# Patient Record
Sex: MALE | Race: WHITE | NOT HISPANIC OR LATINO | URBAN - METROPOLITAN AREA
[De-identification: names, ages, dates, MRNs, and addresses within clinical notes are randomized per-mention and may not be internally consistent; named-entity substitution may affect disease eponyms.]

---

## 2019-05-02 ENCOUNTER — EMERGENCY (EMERGENCY)
Facility: HOSPITAL | Age: 79
LOS: 1 days | Discharge: HOME | End: 2019-05-02
Attending: EMERGENCY MEDICINE
Payer: MEDICARE

## 2019-05-02 VITALS
HEART RATE: 72 BPM | OXYGEN SATURATION: 98 % | DIASTOLIC BLOOD PRESSURE: 83 MMHG | TEMPERATURE: 98 F | RESPIRATION RATE: 16 BRPM | HEIGHT: 71 IN | WEIGHT: 179.9 LBS | SYSTOLIC BLOOD PRESSURE: 211 MMHG

## 2019-05-02 PROCEDURE — 99284 EMERGENCY DEPT VISIT MOD MDM: CPT | Mod: 25

## 2019-05-02 PROCEDURE — 12011 RPR F/E/E/N/L/M 2.5 CM/<: CPT

## 2019-05-03 PROCEDURE — 70450 CT HEAD/BRAIN W/O DYE: CPT

## 2019-05-03 PROCEDURE — 90715 TDAP VACCINE 7 YRS/> IM: CPT

## 2019-05-03 PROCEDURE — 99284 EMERGENCY DEPT VISIT MOD MDM: CPT | Mod: 25

## 2019-05-03 PROCEDURE — 12011 RPR F/E/E/N/L/M 2.5 CM/<: CPT

## 2019-05-03 PROCEDURE — 70450 CT HEAD/BRAIN W/O DYE: CPT | Mod: 26

## 2019-05-03 PROCEDURE — 90471 IMMUNIZATION ADMIN: CPT

## 2019-05-03 RX ORDER — ACETAMINOPHEN 500 MG
650 TABLET ORAL ONCE
Qty: 0 | Refills: 0 | Status: COMPLETED | OUTPATIENT
Start: 2019-05-03 | End: 2019-05-03

## 2019-05-03 RX ORDER — TETANUS TOXOID, REDUCED DIPHTHERIA TOXOID AND ACELLULAR PERTUSSIS VACCINE, ADSORBED 5; 2.5; 8; 8; 2.5 [IU]/.5ML; [IU]/.5ML; UG/.5ML; UG/.5ML; UG/.5ML
0.5 SUSPENSION INTRAMUSCULAR ONCE
Qty: 0 | Refills: 0 | Status: COMPLETED | OUTPATIENT
Start: 2019-05-03 | End: 2019-05-03

## 2019-05-03 RX ADMIN — TETANUS TOXOID, REDUCED DIPHTHERIA TOXOID AND ACELLULAR PERTUSSIS VACCINE, ADSORBED 0.5 MILLILITER(S): 5; 2.5; 8; 8; 2.5 SUSPENSION INTRAMUSCULAR at 01:13

## 2019-05-03 RX ADMIN — Medication 650 MILLIGRAM(S): at 01:13

## 2019-05-03 NOTE — ED PROVIDER NOTE - NSFOLLOWUPINSTRUCTIONS_ED_ALL_ED_FT
Follow up with your primary doctor in 3-5 days as needed  Tylenol 500 mg every 8 hours for pain  Return to the ER in 5-7 days for suture removal  Keep your laceration covered and dry for 2 days, then gentle soap and water  Keep your forehead covered while outside to minimize the chance of scar

## 2019-05-03 NOTE — ED PROVIDER NOTE - CLINICAL SUMMARY MEDICAL DECISION MAKING FREE TEXT BOX
Linear laceration to forehead, stood up into metallic wall light fixture.  No LOC, ams, only on asa 81.  No n/v.  Acting appropriately as per wife.  CT head given age, lac repair, tdap, tylenol, reassess.  --BMM

## 2019-05-03 NOTE — ED PROVIDER NOTE - OBJECTIVE STATEMENT
78 yo male accompanied by his wife in room 3 presents to the ER for evaluation of head injury with forehead laceration. Pt states "I accidentally bumped my head on a sconce light and cut my forhead". Pt with 2 cm linear laceration to forehead, bleeding controlled with direct pressure. TDAP status not utd as per pt. Pt denies LOC, takes ASA daily for cardiac stents.  Denies dizzziness chest pains shortness of breath nausea vomiting headache.

## 2019-05-03 NOTE — ED ADULT NURSE NOTE - OBJECTIVE STATEMENT
79 year old male patient presents ambulatory to ED c/o laceration to head after bumping into a light, mimimal active bleeding noted. Denies LOC, unknown teatanus. Denies current dizziness, change in vision, n/v, fever, chills. Patient aware of plan of care.

## 2019-05-08 ENCOUNTER — EMERGENCY (EMERGENCY)
Facility: HOSPITAL | Age: 79
LOS: 1 days | Discharge: HOME | End: 2019-05-08
Attending: EMERGENCY MEDICINE
Payer: MEDICARE

## 2019-05-08 VITALS
OXYGEN SATURATION: 96 % | RESPIRATION RATE: 19 BRPM | TEMPERATURE: 98 F | HEART RATE: 78 BPM | SYSTOLIC BLOOD PRESSURE: 172 MMHG | HEIGHT: 71 IN | WEIGHT: 179.9 LBS | DIASTOLIC BLOOD PRESSURE: 76 MMHG

## 2019-05-08 DIAGNOSIS — Z48.02 ENCOUNTER FOR REMOVAL OF SUTURES: ICD-10-CM

## 2019-05-08 PROCEDURE — G0463: CPT

## 2019-05-08 RX ORDER — OXYCODONE HYDROCHLORIDE 5 MG/1
5 TABLET ORAL ONCE
Qty: 0 | Refills: 0 | Status: DISCONTINUED | OUTPATIENT
Start: 2019-05-08 | End: 2019-05-08

## 2019-05-08 NOTE — ED PROVIDER NOTE - OBJECTIVE STATEMENT
Attending note. Patient was seen in the fast track eye room.  Patient is here for suture removal. Patient sustained laceration to forehead when he walked into a wall sconce 5 days ago. He denies any pain or discharge.

## 2019-05-08 NOTE — ED PROVIDER NOTE - CLINICAL SUMMARY MEDICAL DECISION MAKING FREE TEXT BOX
Attending note. Suture removal for forehead laceration which was repaired 5 days ago. No signs of infection. Wound is healed well enough for suture removal.

## 2019-05-08 NOTE — ED ADULT NURSE NOTE - OBJECTIVE STATEMENT
78 yo presents to the ED from home. A&Ox4, ambulatory in need of suture removal. lac to forehead 5 days ago. no complaints since. minor HA, has not needed pain medication for HA. no n/v. no discharge from suture. no fever, chills. no diabetes history. on baby aspirin daily. no bleeding noted.

## 2019-05-08 NOTE — ED PROVIDER NOTE - PHYSICAL EXAMINATION
Attending note. The patient is alert and in no acute distress. Patient has a 2 cm laceration horizontally on the center of the forehead without erythema, tenderness or signs of infection

## 2019-05-08 NOTE — ED ADULT NURSE NOTE - NSIMPLEMENTINTERV_GEN_ALL_ED
Implemented All Universal Safety Interventions:  [place] to call system. Call bell, personal items and telephone within reach. Instruct patient to call for assistance. Room bathroom lighting operational. Non-slip footwear when patient is off stretcher. Physically safe environment: no spills, clutter or unnecessary equipment. Stretcher in lowest position, wheels locked, appropriate side rails in place.

## 2019-05-08 NOTE — ED PROVIDER NOTE - NSFOLLOWUPINSTRUCTIONS_ED_ALL_ED_FT
apply bacitracin to head 2x a day  REturn to the eR for any concerns such as uncontrolled pain, bleeding fevers chills  wash affected area gently with water and pat dry- do not scrub or rub

## 2020-02-18 NOTE — ED ADULT NURSE NOTE - NSHOSCREENINGQ1_ED_ALL_ED
Detail Level: Detailed
Add 61155 Cpt? (Important Note: In 2017 The Use Of 83952 Is Being Tracked By Cms To Determine Future Global Period Reimbursement For Global Periods): no
No
